# Patient Record
Sex: FEMALE | Race: OTHER | Employment: UNEMPLOYED | URBAN - METROPOLITAN AREA
[De-identification: names, ages, dates, MRNs, and addresses within clinical notes are randomized per-mention and may not be internally consistent; named-entity substitution may affect disease eponyms.]

---

## 2017-05-16 ENCOUNTER — HOSPITAL ENCOUNTER (OUTPATIENT)
Dept: LAB | Age: 67
Discharge: HOME | End: 2017-05-16

## 2017-05-16 ENCOUNTER — OFFICE VISIT (OUTPATIENT)
Dept: FAMILY MEDICINE CLINIC | Age: 67
End: 2017-05-16

## 2017-05-16 VITALS
TEMPERATURE: 98.1 F | SYSTOLIC BLOOD PRESSURE: 195 MMHG | WEIGHT: 130.8 LBS | BODY MASS INDEX: 27.46 KG/M2 | DIASTOLIC BLOOD PRESSURE: 85 MMHG | OXYGEN SATURATION: 96 % | HEIGHT: 58 IN | HEART RATE: 73 BPM

## 2017-05-16 DIAGNOSIS — Z13.0 SCREENING, ANEMIA, DEFICIENCY, IRON: ICD-10-CM

## 2017-05-16 DIAGNOSIS — I10 ESSENTIAL HYPERTENSION: ICD-10-CM

## 2017-05-16 LAB
ALBUMIN SERPL BCP-MCNC: 3.6 G/DL (ref 3.5–5)
ALBUMIN/GLOB SERPL: 0.9 {RATIO} (ref 1.1–2.2)
ALP SERPL-CCNC: 111 U/L (ref 45–117)
ALT SERPL-CCNC: 23 U/L (ref 12–78)
ANION GAP BLD CALC-SCNC: 10 MMOL/L (ref 5–15)
AST SERPL W P-5'-P-CCNC: 20 U/L (ref 15–37)
BILIRUB SERPL-MCNC: 0.3 MG/DL (ref 0.2–1)
BUN SERPL-MCNC: 13 MG/DL (ref 6–20)
BUN/CREAT SERPL: 17 (ref 12–20)
CALCIUM SERPL-MCNC: 9.8 MG/DL (ref 8.5–10.1)
CHLORIDE SERPL-SCNC: 97 MMOL/L (ref 97–108)
CO2 SERPL-SCNC: 31 MMOL/L (ref 21–32)
CREAT SERPL-MCNC: 0.75 MG/DL (ref 0.55–1.02)
EST. AVERAGE GLUCOSE BLD GHB EST-MCNC: 240 MG/DL
GLOBULIN SER CALC-MCNC: 4.2 G/DL (ref 2–4)
GLUCOSE POC: NORMAL MG/DL
GLUCOSE SERPL-MCNC: 194 MG/DL (ref 65–100)
HBA1C MFR BLD: 10 % (ref 4.2–6.3)
HGB BLD-MCNC: 14.6 G/DL
POTASSIUM SERPL-SCNC: 4.7 MMOL/L (ref 3.5–5.1)
PROT SERPL-MCNC: 7.8 G/DL (ref 6.4–8.2)
SODIUM SERPL-SCNC: 138 MMOL/L (ref 136–145)

## 2017-05-16 PROCEDURE — 83036 HEMOGLOBIN GLYCOSYLATED A1C: CPT | Performed by: NURSE PRACTITIONER

## 2017-05-16 PROCEDURE — 80053 COMPREHEN METABOLIC PANEL: CPT | Performed by: NURSE PRACTITIONER

## 2017-05-16 RX ORDER — LISINOPRIL 20 MG/1
20 TABLET ORAL DAILY
Qty: 30 TAB | Refills: 1 | Status: SHIPPED | OUTPATIENT
Start: 2017-05-16 | End: 2017-05-31 | Stop reason: DRUGHIGH

## 2017-05-16 NOTE — PROGRESS NOTES
Results for orders placed or performed in visit on 05/16/17   AMB POC GLUCOSE BLOOD, BY GLUCOSE MONITORING DEVICE   Result Value Ref Range    Glucose  NF mg/dL   AMB POC HEMOGLOBIN (HGB)   Result Value Ref Range    Hemoglobin (POC) 14.6

## 2017-05-16 NOTE — PROGRESS NOTES
Assessment/Plan:       ICD-10-CM ICD-9-CM    1. Diabetes, type 1.5, uncontrolled, managed as type 1 (HCC) E13.65 250.02 AMB POC GLUCOSE BLOOD, BY GLUCOSE MONITORING DEVICE      insulin NPH (NOVOLIN N, HUMULIN N) 100 unit/mL injection      HEMOGLOBIN A1C WITH EAG      METABOLIC PANEL, COMPREHENSIVE      insulin regular (NOVOLIN R, HUMULIN R) 100 unit/mL injection   2. Screening, anemia, deficiency, iron Z13.0 V78.0 AMB POC HEMOGLOBIN (HGB)   3. Essential hypertension C68 048.1 METABOLIC PANEL, COMPREHENSIVE      lisinopril (PRINIVIL, ZESTRIL) 20 mg tablet    Greater than 50% of this 25 minute visit was spent in face-to-face counseling/coordination of care regarding diabetes management. Follow-up Disposition:  Return ASA - next Tuesday, for diabetes, hypertension, primary care needs. [facility]  Subjective:   Berny Loomis is a 73 y.o.  female who speaks Upper sorbian. She has been here for 7 days, came from [place]. Here with her son. Losartan and humalog in a pen and lansoprazole. Use 25 in the morning and 10 units in the afternoon. On insulin for 2 years. Before that she was using another one called Greenphire. She has had diabetes for 20 years. The other one   Left: a year ago in August had amputation. On the left she had debridement in between great and second toe. Currently looks healthy. On the right she had amputation of the great toe. On the right this surgery was 2 years ago in October (1.5 years ago now). Chief Complaint   Patient presents with    Diabetes     f/u, pt c/o loosing her eye sight, med refill    Hypertension     f/u, med refill   Brought in medications? no Measuring glucoses? no     Work/employment: no  Lost the vision in the left eye 3 months ago. R one started 2 months ago. Is not aware that her eye problems are at all related to her chronic, uncontrolled HTN and DM. Has gastric reflux.   ROS:   no polyuria or polydipsia, no chest pain, dyspnea or TIA's. Social History: She reports that she has never smoked. She does not have any smokeless tobacco history on file. She reports that she does not drink alcohol or use illicit drugs. Family History: Her family history is not on file. No outpatient prescriptions prior to visit. No facility-administered medications prior to visit. Surgical History: No past surgical history on file. Objective:     Vitals:    05/16/17 1017 05/16/17 1030   BP: (!) 202/89 195/85   Pulse: 72 73   Temp: 98.1 °F (36.7 °C)    TempSrc: Oral    SpO2: 96%    Weight: 130 lb 12.8 oz (59.3 kg)    Height: 4' 9.6\" (1.463 m)     No LMP recorded. Patient is postmenopausal.  Results for orders placed or performed in visit on 05/16/17   AMB POC GLUCOSE BLOOD, BY GLUCOSE MONITORING DEVICE   Result Value Ref Range    Glucose  NF mg/dL   AMB POC HEMOGLOBIN (HGB)   Result Value Ref Range    Hemoglobin (POC) 14.6     She is fasting. Last ate 1 pm afternoon. Wt Readings from Last 2 Encounters:   05/16/17 130 lb 12.8 oz (59.3 kg)   Constitutional: She appears well-developed. Eyes: EOM are normal. Pupils are equal, round, and reactive to light. Neck: Neck supple. No thyromegaly present. Cardiovascular: Normal rate, regular rhythm, normal heart sounds and intact distal pulses. No murmur heard. Pulmonary/Chest: Effort normal and breath sounds normal.   Musculoskeletal: She exhibits no edema. No ulcers of the lower extremities. Assessment/Plan:   Radha Mckenna was seen today for diabetes and hypertension. Diagnoses and all orders for this visit:    Diabetes, type 1.5, uncontrolled, managed as type 1 ([address]  -     AMB POC GLUCOSE BLOOD, BY GLUCOSE MONITORING DEVICE  -     insulin NPH (NOVOLIN N, HUMULIN N) 100 unit/mL injection; 10 units sc q12h (English sig)  -     HEMOGLOBIN A1C WITH EAG; Future  -     METABOLIC PANEL, COMPREHENSIVE;  Future  -     insulin regular (NOVOLIN R, HUMULIN R) 100 unit/mL injection; Inject 15 units sc 30 minutes ac meals; Persian sig She still has some of her humalog left. Discussed she may continue to use this and when it is finished, she should switch to Regular Insulin due to affordability. Screening, anemia, deficiency, iron  -     AMB POC HEMOGLOBIN (HGB)    Essential hypertension  -     METABOLIC PANEL, COMPREHENSIVE; Future  -     lisinopril (PRINIVIL, ZESTRIL) 20 mg tablet; Take 1 Tab by mouth daily. For blood pressure instead of Losartan; Idania 1 tab diario en vez de Losartan    Diabetes Mellitus type 2, under Poor  control. Blood pressure under Poor control. Greater than 50% of this 25 minute visit was spent in face-to-face counseling/coordination of care regarding diabetes management. Follow-up Disposition:  Return ASAP - next Tuesday, for diabetes, hypertension, primary care needs. Needs education on using the meter; needs education on family member to inject insulin N. She has been in [place] for 1 week and is therefore not eligible for Access Now. We will provide information on VEI and explained that this would be self-pay. Lobo Bernal, MSN, RN, FNP-BC, BC-ADM  Kurtis Chou expressed understanding of this plan.

## 2017-05-23 ENCOUNTER — HOSPITAL ENCOUNTER (EMERGENCY)
Age: 67
Discharge: HOME | End: 2017-05-23
Attending: EMERGENCY MEDICINE
Payer: SELF-PAY

## 2017-05-23 ENCOUNTER — OFFICE VISIT (OUTPATIENT)
Dept: FAMILY MEDICINE CLINIC | Age: 67
End: 2017-05-23

## 2017-05-23 ENCOUNTER — APPOINTMENT (OUTPATIENT)
Dept: CT IMAGING | Age: 67
End: 2017-05-23
Attending: EMERGENCY MEDICINE
Payer: SELF-PAY

## 2017-05-23 VITALS
TEMPERATURE: 98.2 F | DIASTOLIC BLOOD PRESSURE: 73 MMHG | SYSTOLIC BLOOD PRESSURE: 208 MMHG | OXYGEN SATURATION: 100 % | HEART RATE: 89 BPM | RESPIRATION RATE: 16 BRPM

## 2017-05-23 VITALS
DIASTOLIC BLOOD PRESSURE: 88 MMHG | TEMPERATURE: 98.8 F | SYSTOLIC BLOOD PRESSURE: 216 MMHG | BODY MASS INDEX: 27.55 KG/M2 | OXYGEN SATURATION: 100 % | WEIGHT: 130 LBS

## 2017-05-23 DIAGNOSIS — H54.3 VISION LOSS, BILATERAL: ICD-10-CM

## 2017-05-23 DIAGNOSIS — I15.9 SECONDARY HYPERTENSION: Primary | ICD-10-CM

## 2017-05-23 DIAGNOSIS — E13.39: ICD-10-CM

## 2017-05-23 DIAGNOSIS — R73.09 ELEVATED GLUCOSE: ICD-10-CM

## 2017-05-23 LAB
ANION GAP BLD CALC-SCNC: 6 MMOL/L (ref 5–15)
BASOPHILS # BLD AUTO: 0.1 K/UL (ref 0–0.1)
BASOPHILS # BLD: 0 % (ref 0–1)
BUN SERPL-MCNC: 21 MG/DL (ref 6–20)
BUN/CREAT SERPL: 22 (ref 12–20)
CALCIUM SERPL-MCNC: 9 MG/DL (ref 8.5–10.1)
CHLORIDE SERPL-SCNC: 102 MMOL/L (ref 97–108)
CO2 SERPL-SCNC: 28 MMOL/L (ref 21–32)
CREAT SERPL-MCNC: 0.94 MG/DL (ref 0.55–1.02)
EOSINOPHIL # BLD: 0.2 K/UL (ref 0–0.4)
EOSINOPHIL NFR BLD: 1 % (ref 0–7)
ERYTHROCYTE [DISTWIDTH] IN BLOOD BY AUTOMATED COUNT: 14 % (ref 11.5–14.5)
GLUCOSE BLD STRIP.AUTO-MCNC: 195 MG/DL (ref 65–100)
GLUCOSE POC: NORMAL MG/DL
GLUCOSE SERPL-MCNC: 231 MG/DL (ref 65–100)
HCT VFR BLD AUTO: 41.1 % (ref 35–47)
HGB BLD-MCNC: 12.9 G/DL (ref 11.5–16)
LYMPHOCYTES # BLD AUTO: 27 % (ref 12–49)
LYMPHOCYTES # BLD: 3.3 K/UL (ref 0.8–3.5)
MCH RBC QN AUTO: 25.7 PG (ref 26–34)
MCHC RBC AUTO-ENTMCNC: 31.4 G/DL (ref 30–36.5)
MCV RBC AUTO: 82 FL (ref 80–99)
MONOCYTES # BLD: 0.8 K/UL (ref 0–1)
MONOCYTES NFR BLD AUTO: 7 % (ref 5–13)
NEUTS SEG # BLD: 8.1 K/UL (ref 1.8–8)
NEUTS SEG NFR BLD AUTO: 65 % (ref 32–75)
PLATELET # BLD AUTO: 238 K/UL (ref 150–400)
POTASSIUM SERPL-SCNC: 4.1 MMOL/L (ref 3.5–5.1)
RBC # BLD AUTO: 5.01 M/UL (ref 3.8–5.2)
SERVICE CMNT-IMP: ABNORMAL
SODIUM SERPL-SCNC: 136 MMOL/L (ref 136–145)
TROPONIN I SERPL-MCNC: <0.04 NG/ML
WBC # BLD AUTO: 12.5 K/UL (ref 3.6–11)

## 2017-05-23 PROCEDURE — 74011250637 HC RX REV CODE- 250/637: Performed by: EMERGENCY MEDICINE

## 2017-05-23 PROCEDURE — 93005 ELECTROCARDIOGRAM TRACING: CPT

## 2017-05-23 PROCEDURE — 70450 CT HEAD/BRAIN W/O DYE: CPT

## 2017-05-23 PROCEDURE — 99283 EMERGENCY DEPT VISIT LOW MDM: CPT

## 2017-05-23 PROCEDURE — 85025 COMPLETE CBC W/AUTO DIFF WBC: CPT | Performed by: EMERGENCY MEDICINE

## 2017-05-23 PROCEDURE — 80048 BASIC METABOLIC PNL TOTAL CA: CPT | Performed by: EMERGENCY MEDICINE

## 2017-05-23 PROCEDURE — 84484 ASSAY OF TROPONIN QUANT: CPT | Performed by: EMERGENCY MEDICINE

## 2017-05-23 PROCEDURE — 74011000250 HC RX REV CODE- 250: Performed by: EMERGENCY MEDICINE

## 2017-05-23 PROCEDURE — 36415 COLL VENOUS BLD VENIPUNCTURE: CPT | Performed by: EMERGENCY MEDICINE

## 2017-05-23 PROCEDURE — 82962 GLUCOSE BLOOD TEST: CPT

## 2017-05-23 RX ORDER — TETRACAINE HYDROCHLORIDE 5 MG/ML
1 SOLUTION OPHTHALMIC
Status: COMPLETED | OUTPATIENT
Start: 2017-05-23 | End: 2017-05-23

## 2017-05-23 RX ORDER — HYDROCHLOROTHIAZIDE 25 MG/1
25 TABLET ORAL
Status: COMPLETED | OUTPATIENT
Start: 2017-05-23 | End: 2017-05-23

## 2017-05-23 RX ORDER — HYDROCHLOROTHIAZIDE 25 MG/1
25 TABLET ORAL DAILY
Qty: 30 TAB | Refills: 0 | Status: SHIPPED | OUTPATIENT
Start: 2017-05-23 | End: 2017-05-31 | Stop reason: ALTCHOICE

## 2017-05-23 RX ADMIN — HYDROCHLOROTHIAZIDE 25 MG: 25 TABLET ORAL at 16:48

## 2017-05-23 RX ADMIN — TETRACAINE HYDROCHLORIDE 1 DROP: 5 SOLUTION OPHTHALMIC at 16:20

## 2017-05-23 NOTE — PROGRESS NOTES
Coordination of Care  1. Have you been to the ER, urgent care clinic since your last visit? Hospitalized since your last visit? No    2. Have you seen or consulted any other health care providers outside of the [address] [place] [address] since your last visit? Include any pap smears or colon screening.  No    Medications  Medication Reconciliation Performed: no  Patient does need refills     Learning Assessment Complete? yes    Results for orders placed or performed in visit on 05/23/17   AMB POC GLUCOSE BLOOD, BY GLUCOSE MONITORING DEVICE   Result Value Ref Range    Glucose POC non fasting 240 mg/dL

## 2017-05-23 NOTE — ED TRIAGE NOTES
Pt sent here for elevated BP, elevated glucose and blurry vision that has increased recently. Denies pain or n/v.   Delay in EKG and triage due to use of language line for triage.

## 2017-05-23 NOTE — Clinical Note
Naomy Sen, you will see this patient tomorrow. Her main concern is her vision loss and she is not eligible for Access Now. I recommend that she check fasting glucoses and aim for a range of . If she is above this, I'd recommend increasing her NPH insulin qhs by 2 units every 3 days. Call/text me if you have any questions.

## 2017-05-23 NOTE — DISCHARGE INSTRUCTIONS
We hope that we have addressed all of your medical concerns. The examination and treatment you received in the Emergency Department were for an emergent problem and were not intended as complete care. It is important that you follow up with your healthcare provider(s) for ongoing care. If your symptoms worsen or do not improve as expected, and you are unable to reach your usual health care provider(s), you should return to the Emergency Department. Today's healthcare is undergoing tremendous change, and patient satisfaction surveys are one of the many tools to assess the quality of medical care. You may receive a survey from the NPS regarding your experience in the Emergency Department. I hope that your experience has been completely positive, particularly the medical care that I provided. As such, please participate in the survey; anything less than excellent does not meet my expectations or intentions. [facility] and 8 [facility] participate in nationally recognized quality of care measures. If your blood pressure is greater than 120/80, as reported below, we urge that you seek medical care to address the potential of high blood pressure, commonly known as hypertension. Hypertension can be hereditary or can be caused by certain medical conditions, pain, stress, or \"white coat syndrome. \"       Please make an appointment with your health care provider(s) for follow up of your Emergency Department visit. VITALS:   Patient Vitals for the past 8 hrs:   Temp Pulse Resp BP SpO2   05/23/17 1816 - - - (!) 208/73 -   05/23/17 1650 - - - (!) 231/69 -   05/23/17 1516 98.2 °F (36.8 °C) 89 16 (!) 209/87 100 %          Thank you for allowing us to provide you with medical care today. We realize that you have many choices for your emergency care needs. Please choose us in the future for any continued health care needs.       Regards, Theadore Favre, MD    [facility], [facility].   Office: [phone]            Recent Results (from the past 24 hour(s))   AMB POC GLUCOSE BLOOD, BY GLUCOSE MONITORING DEVICE    Collection Time: 05/23/17  1:58 PM   Result Value Ref Range    Glucose POC non fasting 240 mg/dL   GLUCOSE, POC    Collection Time: 05/23/17  3:46 PM   Result Value Ref Range    Glucose (POC) 195 (H) 65 - 100 mg/dL    Performed by Cholo Euceda    CBC WITH AUTOMATED DIFF    Collection Time: 05/23/17  5:24 PM   Result Value Ref Range    WBC 12.5 (H) 3.6 - 11.0 K/uL    RBC 5.01 3.80 - 5.20 M/uL    HGB 12.9 11.5 - 16.0 g/dL    HCT 41.1 35.0 - 47.0 %    MCV 82.0 80.0 - 99.0 FL    MCH 25.7 (L) 26.0 - 34.0 PG    MCHC 31.4 30.0 - 36.5 g/dL    RDW 14.0 11.5 - 14.5 %    PLATELET 634 331 - 095 K/uL    NEUTROPHILS 65 32 - 75 %    LYMPHOCYTES 27 12 - 49 %    MONOCYTES 7 5 - 13 %    EOSINOPHILS 1 0 - 7 %    BASOPHILS 0 0 - 1 %    ABS. NEUTROPHILS 8.1 (H) 1.8 - 8.0 K/UL    ABS. LYMPHOCYTES 3.3 0.8 - 3.5 K/UL    ABS. MONOCYTES 0.8 0.0 - 1.0 K/UL    ABS. EOSINOPHILS 0.2 0.0 - 0.4 K/UL    ABS. BASOPHILS 0.1 0.0 - 0.1 K/UL   METABOLIC PANEL, BASIC    Collection Time: 05/23/17  5:24 PM   Result Value Ref Range    Sodium 136 136 - 145 mmol/L    Potassium 4.1 3.5 - 5.1 mmol/L    Chloride 102 97 - 108 mmol/L    CO2 28 21 - 32 mmol/L    Anion gap 6 5 - 15 mmol/L    Glucose 231 (H) 65 - 100 mg/dL    BUN 21 (H) 6 - 20 MG/DL    Creatinine 0.94 0.55 - 1.02 MG/DL    BUN/Creatinine ratio 22 (H) 12 - 20      GFR est AA >60 >60 ml/min/1.73m2    GFR est non-AA 60 (L) >60 ml/min/1.73m2    Calcium 9.0 8.5 - 10.1 MG/DL   TROPONIN I    Collection Time: 05/23/17  5:24 PM   Result Value Ref Range    Troponin-I, Qt. <0.04 <0.05 ng/mL       Ct Head Wo Cont    Result Date: 5/23/2017  EXAM:  CT HEAD WO CONT INDICATION:   vision loss. Hypertension. Blurred vision. Hyperglycemia. COMPARISON: None.  TECHNIQUE: Unenhanced CT of the head was performed using 5 mm images. Brain and bone windows were generated. CT dose reduction was achieved through use of a standardized protocol tailored for this examination and automatic exposure control for dose modulation. FINDINGS: The ventricles and sulci are normal in size, shape and configuration and midline. There is no significant white matter disease. There is no intracranial hemorrhage, extra-axial collection, mass, mass effect or midline shift. The basilar cisterns are open. No acute infarct is identified. The bone windows demonstrate no abnormalities. The visualized portions of the paranasal sinuses and mastoid air cells are clear.      IMPRESSION: Normal CT scan of the head without contrast

## 2017-05-23 NOTE — PROGRESS NOTES
Haitian only speaking patient sent to [facility] ED per provider after provider was given vitals and patient c/o of tingling on face, blurry vision, pt using cane and taking slow steps with son's assistance. Referral slip given to pt son to turn in to ED. Care card financial assitance information given and explained to patient's son. Patient son expressed worry over the bill, explained to him that patient needed care that the ED can provide as we are not an urgent care or emergency department. Explained to pt son that we have SW that can assist when bill arrives from ED but to contact [facility], # given, after the ED visit. Address given to [facility], This has been fully explained to the patient, who indicates understanding.

## 2017-05-23 NOTE — PROGRESS NOTES
Telephone call to Dr. Keara Kumar at [facility] ER ([phone]). She will add medication for HTN. I will have the patient return regarding her diabetes and further discussion of options for vision care. She is not eligible for Access Now for ophthalmology until she has been in [place], [place] for 6 months. They may call [employer] and explore self-pay options.

## 2017-05-23 NOTE — ED PROVIDER NOTES
HPI Comments: 73 y.o. female with past medical history significant for HTN, DM, and blindness L eye who presents to the ED with chief complaint of vision changes. Pt's son reports pt has hx of blindness in her left eye and has had progressively worsening blurred vision in her right eye for the past year that has especially increased over the past 3 months, says she has a \"funny feeling around her eyes. \" Pt states she has hx of an unknown surgical procedure on her bilateral eyes in [place]. Pt denies being seen by an ophthalmologist. Pt's son states pt has hx of HTN and DM, says she previously had medications from [facility] but was seen at the [address] [place] [address] last week for her blood pressure and was given new prescriptions for 20 mg lisinopril and Novolin. Pt states she is compliant with her BP medication, says she takes it daily at 0800 and has not missed any doses but her BP is \"always elevated at about 465-728'U systolically. \" Pt states she was seen at the [address] [place] [address] again today and was referred to the ED for further evaluation. Pt denies chest pain or headache. There are no other acute medical complaints voiced at this time. Chart review: Pt was seen at the [facility] on 5/16 and had blood work done that showed an elevated HbA1C. Pt was seen at the [facility] again today and had a BP of 216/88. Pt has hx of blindness in her left eye and decreased vision in her right eye. Pt has hx of toe amputation. Social Hx: Goes back and forth to [place] and [facility]. PCP: No primary care provider on file. Note written by Sharan Camarena, as dictated by Christina Michael MD 3:52 PM     The history is provided by the patient, a relative and medical records. The history is limited by a language barrier (British). A  was used (physician). No past medical history on file. No past surgical history on file. No family history on file.     Social History     Social History    Marital status:      Spouse name: N/A    Number of children: N/A    Years of education: N/A     Occupational History    Not on file. Social History Main Topics    Smoking status: Never Smoker    Smokeless tobacco: Not on file    Alcohol use No    Drug use: No    Sexual activity: Not on file     Other Topics Concern    Not on file     Social History Narrative         ALLERGIES: Review of patient's allergies indicates no known allergies. Review of Systems   Eyes: Positive for visual disturbance (blurred vision right eye (blind in L eye)). Cardiovascular: Negative for chest pain. Neurological: Negative for headaches. All other systems reviewed and are negative. Vitals:    05/23/17 1516   BP: (!) 209/87   Pulse: 89   Resp: 16   Temp: 98.2 °F (36.8 °C)   SpO2: 100%            Physical Exam   Constitutional: She is oriented to person, place, and time. She appears well-developed and well-nourished. No distress. HENT:   Head: Normocephalic and atraumatic. Eyes: Conjunctivae and EOM are normal. Pupils are equal, round, and reactive to light. irregularly shaped pupils b/l   Neck: Normal range of motion. Neck supple. Cardiovascular: Normal rate, regular rhythm, normal heart sounds and intact distal pulses. Exam reveals no friction rub. No murmur heard. Pulmonary/Chest: Effort normal and breath sounds normal. No respiratory distress. She has no wheezes. She has no rales. She exhibits no tenderness. Abdominal: Soft. Bowel sounds are normal. She exhibits no distension. There is no tenderness. There is no rebound and no guarding. Musculoskeletal: Normal range of motion. She exhibits no edema or tenderness. Neurological: She is alert and oriented to person, place, and time. No cranial nerve deficit. She exhibits normal muscle tone. Coordination normal.   Skin: Skin is warm and dry. She is not diaphoretic. No pallor. Psychiatric: She has a normal mood and affect.  Her behavior is normal. Nursing note and vitals reviewed. MDM  Number of Diagnoses or Management Options  Diabetes mellitus of other type with other ophthalmic complication, unspecified long term insulin use status:   Secondary hypertension:   Vision loss, bilateral:   Diagnosis management comments: htn- not nw presents last week and pt reports normally 180-190 son reports niece is giving bp meds so pt is getting them. Reviewed diet as welll for bp and DM suspect pt is not following diet. Will discuss with pcp at [address] adding bp med    DM- chronically elevated blood sugar elevated hb a1c at pcp last week will discuss with pcp to manage blood sugars  Vision loss- suspect retinopathy from DM and HTN has been going on for over a year and pt with ? Surgery prior in her country. Check pressure in eyes but this is not acute and will need to follow up with optho. Pt is nerly blind in both eyes       Amount and/or Complexity of Data Reviewed  Clinical lab tests: ordered and reviewed  Tests in the radiology section of CPT®: ordered and reviewed  Obtain history from someone other than the patient: yes (son)  Discuss the patient with other providers: yes (pcp)  Independent visualization of images, tracings, or specimens: yes (ekg)    Patient Progress  Patient progress: stable    ED Course       Procedures     EKG interpretation: (Preliminary)  Rhythm: normal sinus rhythm; and regular . Rate (approx.): 65; Axis: normal; P wave: normal; QRS interval: normal ; ST/T wave: non-specific changes;    CONSULT NOTE:  4:10 PM Gretel Flaherty MD spoke with Kwaku Hendrix NP, Consult for PCP. Discussed available diagnostic tests and clinical findings. She is in agreement with care plans as outlined. Kwaku Hendrix NP said pt's family is frustrated because pt is losing her vision and has to be in the [address] for 6 months in order to get established with access now and get eye care through them.  Recommends adding HCTZ for blood pressure and will call pt to adjust insulin because her blood sugar is not well controlled. PROGRESS NOTE:  4:52 PM   Checked pressure of pt's eyes. Left eye 23 mm Hg and right eye 22 mm Hg.     5:26 PM  Spoke with pt and son. Explained she has lost vision and needs to follow up with optho they will need to pay out of pocket will [facility] to [facility] as this is a longstanding issue. Son will try to get greencard for pt so she can have access to more resources. Reviewed diet and will add bp med,. They will follos up in 1 week.  Reviewed diet and importance of bp and glucose control extensively as pt needs those managed to help her vision

## 2017-05-24 LAB
ATRIAL RATE: 66 BPM
CALCULATED P AXIS, ECG09: 22 DEGREES
CALCULATED R AXIS, ECG10: 16 DEGREES
CALCULATED T AXIS, ECG11: 52 DEGREES
DIAGNOSIS, 93000: NORMAL
P-R INTERVAL, ECG05: 130 MS
Q-T INTERVAL, ECG07: 396 MS
QRS DURATION, ECG06: 82 MS
QTC CALCULATION (BEZET), ECG08: 415 MS
VENTRICULAR RATE, ECG03: 66 BPM

## 2017-05-25 PROBLEM — I10 ESSENTIAL HYPERTENSION: Status: ACTIVE | Noted: 2017-05-25

## 2017-05-30 ENCOUNTER — TELEPHONE (OUTPATIENT)
Dept: FAMILY MEDICINE CLINIC | Age: 67
End: 2017-05-30

## 2017-05-30 NOTE — TELEPHONE ENCOUNTER
I returned the patient's son, Mr. Queta Scruggs, phone call who asked me to contact his brother, Mr. Gabe Russell, because he was at work and could not speak but he needed to schedule a follow up appointment for his mother after the ER encounter on May 23, 2017. Both children are authorized parties to disclose health information per PHI signed on 05/16/17. I called Mr. Ashish Moy, and after speaking with the Nurse Navigator Des Vásquez, I  scheduled the follow up appointment  with Conrado Villaseñor NP for tomorrow at the [facility] location. Mr. Gabe Russell verbalized understanding and was very grateful that we could accommodate his mother in tomorrow's clinic. Per Mr. Gabe Russell request, I sent via text the appointment information. Phone call routed to Joseline Anna, [address] [place] [address], [place]OBERTO.     Elena Watson

## 2017-05-31 ENCOUNTER — OFFICE VISIT (OUTPATIENT)
Dept: FAMILY MEDICINE CLINIC | Age: 67
End: 2017-05-31

## 2017-05-31 VITALS
DIASTOLIC BLOOD PRESSURE: 78 MMHG | SYSTOLIC BLOOD PRESSURE: 180 MMHG | BODY MASS INDEX: 28.12 KG/M2 | TEMPERATURE: 98.6 F | WEIGHT: 132.7 LBS | HEART RATE: 75 BPM

## 2017-05-31 DIAGNOSIS — I10 ESSENTIAL HYPERTENSION: ICD-10-CM

## 2017-05-31 DIAGNOSIS — Z13.1 SCREENING FOR DIABETES MELLITUS (DM): ICD-10-CM

## 2017-05-31 LAB — GLUCOSE POC: NORMAL MG/DL

## 2017-05-31 RX ORDER — LISINOPRIL AND HYDROCHLOROTHIAZIDE 20; 25 MG/1; MG/1
1 TABLET ORAL DAILY
Qty: 90 TAB | Refills: 1 | Status: SHIPPED | OUTPATIENT
Start: 2017-05-31

## 2017-05-31 RX ORDER — GLIPIZIDE 5 MG/1
5 TABLET ORAL 2 TIMES DAILY
Qty: 60 TAB | Refills: 2 | Status: SHIPPED | OUTPATIENT
Start: 2017-05-31

## 2017-05-31 NOTE — PROGRESS NOTES
Subjective:     Chief Complaint   Patient presents with   [facility] Follow Up    Eye Problem     [facility]        She  is a 73 y.o. female who presents for evaluation of continued bilateral eye pain and vision loss in L eye and poor vision in R eye. Has been continuing insulin NPH and reg BID as directed. Went to the ED on 5/23 and was started on HCTZ for additional BP control. Blood sugars have been running in the upper 180s. BP continues in the 170s. No hypoglycemic episodes. Current Rx well tolerated. Pt also c/o of gastritis x 3-4 months. Poor relief from OTC nexium. ROS  Gen - no fever/chills  Resp - no dyspnea or cough  CV - no chest pain or ESPINO  Rest per HPI    No past medical history on file. No past surgical history on file. Current Outpatient Prescriptions on File Prior to Visit   Medication Sig Dispense Refill    hydroCHLOROthiazide (HYDRODIURIL) 25 mg tablet Take 1 Tab by mouth daily for 30 days. 30 Tab 0    insulin NPH (NOVOLIN N, HUMULIN N) 100 unit/mL injection 10 units sc q12h (Bahamian sig) 1 Vial 2    lisinopril (PRINIVIL, ZESTRIL) 20 mg tablet Take 1 Tab by mouth daily. For blood pressure instead of Losartan; Idania 1 tab diario en vez de Losartan 30 Tab 1    insulin regular (NOVOLIN R, HUMULIN R) 100 unit/mL injection Inject 15 units sc 30 minutes ac meals; Bahamian sig 1 Vial 1     No current facility-administered medications on file prior to visit.          Objective:     Vitals:    05/31/17 1456 05/31/17 1504   BP: 172/77 180/78   Pulse: 74 75   Temp: 98.6 °F (37 °C)    TempSrc: Oral    Weight: 132 lb 11.2 oz (60.2 kg)        Physical Examination:  General appearance - alert, well appearing, and in no distress  Eyes -sclera anicteric  Neck - supple, no significant adenopathy, no thyromegaly  Chest - clear to auscultation, no wheezes, rales or rhonchi, symmetric air entry  Heart - normal rate, regular rhythm, normal S1, S2, no murmurs, rubs, clicks or gallops  Neurological - alert, oriented, no focal findings or movement disorder noted  Abdomen-BS present/WNL x 4 quads, non-tender/distended, soft,no organomegaly    Recent Results (from the past 12 hour(s))   AMB POC GLUCOSE BLOOD, BY GLUCOSE MONITORING DEVICE    Collection Time: 05/31/17  3:01 PM   Result Value Ref Range    Glucose  Non-fasting mg/dL       Assessment/ Plan:   Stephany Diana was seen today for hospital follow up and eye problem. Diagnoses and all orders for this visit:    Diabetes, type 1.5, uncontrolled, managed as type 1 ([address]  -     lisinopril-hydroCHLOROthiazide (PRINZIDE, ZESTORETIC) 20-25 mg per tablet; Take 1 Tab by mouth daily.  -     glipiZIDE (GLUCOTROL) 5 mg tablet; Take 1 Tab by mouth two (2) times a day. Screening for diabetes mellitus (DM)  -     AMB POC GLUCOSE BLOOD, BY GLUCOSE MONITORING DEVICE    Essential hypertension  -     lisinopril-hydroCHLOROthiazide (PRINZIDE, ZESTORETIC) 20-25 mg per tablet; Take 1 Tab by mouth daily. ? Compliance w/ BP meds given minor change since LOV. Will have Pt start trial of PO glipizide given improving sugars. Continue current insulin dose. Refer to [facility] for follow-up/Crossover eye exam appt referral. Discussed timing and how Pt may benefit from a community eye exam   For Rx/meds in the meantime. Combine lisinopril/HCTZ into one pill. Re-assess BP in 3-4 weeks. Unable to check labs today, will check H Pylori at f/u. I have discussed the diagnosis with the patient and the intended plan as seen in the above orders. The patient has received an after-visit summary and questions were answered concerning future plans. I have discussed medication side effects and warnings with the patient as well. The patient verbalizes understanding and agreement with the plan. Follow-up Disposition:  Return in about 4 weeks (around 6/28/2017).

## 2017-05-31 NOTE — PATIENT INSTRUCTIONS
Glipizida (Por la boca)   Se [place] para tratar la diabetes tipo 2. Angelita(s) : Glucotrol, Glucotrol XL   Existen muchas otras marcas de [place]. Sara medicamento no debe ser usado cuando:   Sara medicamento no es adecuado para todas las personas. No lo use si ha tenido leon reacción alérgica a la glipizida. Forma de usar sara medicamento:   Bernadine Olga de liberación prolongada  · [place] du medicamentos jermaine se le haya indicado. Es probable que sea necesario cambiar quinteros dosis varias veces hasta encontrar la que funciona mejor para usted. · [place] la tableta regular  aproximadamente 30 minutos antes de comer. [place] la tableta de liberación prolongada  con el desayuno, a menos que quinteros médico le indique leon hora diferente. · [place] la tableta de liberación prolongada entera. No triture, rompa o mastique. · Sia y siga las instrucciones para el paciente que vienen con el medicamento. Hable con quinteros médico o farmacéutico si tiene alguna pregunta. · Si olvida leon dosis: Si olvida leon dosis de quinteros medicamento, tómelo lo más pronto posible. Si es jen la hora para quinteros próxima dosis, espere hasta entonces para maría quinteros dosis regular. No use medicamento adicional para reponer la dosis olvidada. · Guarde el medicamento en un recipiente cerrado a temperatura ambiente y alejado del calor, la humedad y la leah directa. Medicamentos y [employer] que debe evitar:   Consulte con quinteros médico o farmacéutico antes de usar cualquier medicamento, incluyendo los que compra sin receta médica, las vitaminas y los productos herbales. · Algunos medicamentos podrían afectar la función de glipizida.  Informe a quinteros médico si está usando cualquiera de los siguientes:  ¨ Aspirina, cloramfenicol, fluconazol, isoniazida, miconazol, niacina, fenitoína, probenecida  ¨ Las píldoras para evitar embarazo  ¨ Medicamento con betabloqueadores  ¨ Medicamentos para la presión arterial  ¨ Diurético  ¨ Inhibidor de la MAO (monoamino oxidasa)  ¨ Medicamento para tratar Vuong Cookey infección (incluyendo ciprofloxacina, levofloxacina, moxifloxacina)  ¨ Medicamentos antiinflamatorios no esteroides (GARTH) para el dolor o la artritis (incluyendo celecoxib, diclofenac, ibuprofeno, naproxeno)  ¨ Medicamentos con fenotiazina  ¨ Medicamentos esteroideos  ¨ Medicamento con sulfa  ¨ El medicamento tiroideo  · Si usted zo colesevelam también, tómelo al menos 4 horas después de maría glipizida. Precauciones juju el uso de sara medicamento:   · Infórmele al médico si usted está embarazada o dando de lactar, o si tiene enfermedad renal, enfermedad hepática, problemas con el corazón o vasos sanguíneos, problemas estomacales o intestinales, problemas con la glándula suprarrenal o pituitaria o deficiencia de [id number] deshidrogenasa (G6PD). Informe a quinteros médico si usted consume alcohol. · Sara medicamento puede provocarle los siguientes problemas:  ¨ Mayor riesgo de problemas en el corazón o vasos sanguíneos  ¨ Nivel bajo de azúcar en la nery  · Parte de las tabletas de liberación prolongada  puede salir en du evacuaciones. [place] es normal.  · El médico solicitará exámenes de laboratorio juju las citas de rutina para revisar los efectos de [place]. Asista a todas du citas. · Guarde todos los medicamentos fuera del alcance de los niños. Nunca comparta du medicamentos con [employer].   Efectos secundarios que pueden presentarse juju el uso de sara medicamento:   Consulte inmediatamente con el médico si nota cualquiera de estos efectos secundarios:  · Reacción alérgica: Comezón o ronchas, hinchazón del mala o las cate, hinchazón u hormigueo en la boca o garganta, opresión en el pecho, dificultad para respirar  · Visión borrosa, cambios en la vista  · Latido cardíaco acelerado o alyssa, desvanecimiento, mareos  · Dolor de susie o confusión  · Transpiración, temblores, estremecimientos, aumento de apetito  Consulte con el médico si nota los siguientes [place] secundarios menos graves:   · Náuseas, diarrea, malestar estomacal leves  Consulte con el médico si nota otros efectos secundarios que christina son causados por margareth medicamento. Llame a quinteros médico para consultarle Kirill. Usted puede notificar du efectos secundarios al [facility] al [phone]. © 2017 [address]  Information is for End User's use only and may not be sold, redistributed or otherwise used for commercial purposes. Esta información es sólo para uso en educación. Quinteros intención no es darle un consejo médico sobre enfermedades o tratamientos. Colsulte con quinteros Nia Holy farmacéutico antes de seguir cualquier régimen médico para saber si es seguro y efectivo para usted.

## 2017-05-31 NOTE — PROGRESS NOTES
Coordination of Care  1. Have you been to the ER, urgent care clinic since your last visit? Hospitalized since your last visit? 5/23/17, [facility]    2. Have you seen or consulted any other health care providers outside of the [address] [place] [address] since your last visit? Include any pap smears or colon screening.  No    Medications  Medication Reconciliation Performed: no  Patient does need refills     Learning Assessment Complete? yes  Results for orders placed or performed in visit on 05/31/17   AMB POC GLUCOSE BLOOD, BY GLUCOSE MONITORING DEVICE   Result Value Ref Range    Glucose  Non-fasting mg/dL

## 2017-06-12 ENCOUNTER — OFFICE VISIT (OUTPATIENT)
Dept: FAMILY MEDICINE CLINIC | Age: 67
End: 2017-06-12

## 2017-06-12 VITALS
HEART RATE: 84 BPM | SYSTOLIC BLOOD PRESSURE: 169 MMHG | DIASTOLIC BLOOD PRESSURE: 71 MMHG | WEIGHT: 132.2 LBS | BODY MASS INDEX: 28.02 KG/M2 | TEMPERATURE: 99.3 F

## 2017-06-12 DIAGNOSIS — I10 ESSENTIAL HYPERTENSION: ICD-10-CM

## 2017-06-12 LAB — GLUCOSE POC: NORMAL MG/DL

## 2017-06-12 RX ORDER — SYRINGE-NEEDLE,INSULIN,0.5 ML 30 GX5/16"
SYRINGE, EMPTY DISPOSABLE MISCELLANEOUS
Qty: 100 SYRINGE | Refills: 11 | Status: SHIPPED | OUTPATIENT
Start: 2017-06-12

## 2017-06-12 NOTE — PROGRESS NOTES
Coordination of Care  1. Have you been to the ER, urgent care clinic since your last visit? Hospitalized since your last visit? No    2. Have you seen or consulted any other health care providers outside of the [address] [place] [address] since your last visit? Include any pap smears or colon screening.  No    Medications  Medication Reconciliation Performed: yes  Patient does need refills     Learning Assessment Complete? yes  Results for orders placed or performed in visit on 06/12/17   AMB POC GLUCOSE BLOOD, BY GLUCOSE MONITORING DEVICE   Result Value Ref Range    Glucose  non fasting mg/dL

## 2017-06-12 NOTE — MR AVS SNAPSHOT
Visit Information Beau Sheffield Personal Médico Departamento Teléfono del Dep. Número de visita 6/12/2017  1:15 PM Stanford Snellen, [place] [phone] Follow-up Instructions Return for Tylenol 325mg 1-2 by mouth every 4 hours for sore throat. Upcoming Health Maintenance Date Due Hepatitis C Screening 1950 LIPID PANEL Q1 1950 FOOT EXAM Q1 11/30/1961 MICROALBUMIN Q1 11/30/1961 EYE EXAM RETINAL OR DILATED Q1 11/30/1961 DTaP/Tdap/Td series (1 - Tdap) 11/30/1972 BREAST CANCER SCRN MAMMOGRAM 11/30/2001 FOBT Q 1 YEAR AGE 50-75 11/30/2001 ZOSTER VACCINE AGE 60> 11/30/2011 GLAUCOMA SCREENING Q2Y 11/30/2016 OSTEOPOROSIS SCREENING (DEXA) 11/30/2016 Pneumococcal 65+ Low/Medium Risk (1 of 2 - PCV13) 11/30/2016 MEDICARE YEARLY EXAM 11/30/2016 INFLUENZA AGE 9 TO ADULT 8/1/2017 HEMOGLOBIN A1C Q6M 11/16/2017 [place]  Review Complete El: 6/12/2017 Por: Stanford Snellen, NP  
 Giftyclarence Morrow del:  6/12/2017 No Known Allergies Vacunas actuales [place] No hay ninguna vacuna archivada. No revisadas esta visita You Were Diagnosed With   
  
 Blondie Bel Diabetes, type 1.5, uncontrolled, managed as type 1 ([facility]    -  Primary ICD-10-CM: E13.65 ICD-9-CM: 250.02 Partes vitales PS Pulso Temperatura Peso (percentil de crecimiento) BMI ([facility] Estado obstétrico  
 169/71 (BP 1 Location: Left arm, BP Patient Position: Sitting) 84 99.3 °F (37.4 °C) (Oral) 132 lb 3.2 oz (60 kg) 28.02 kg/m2 Postmenopausal  
 Estatus de tabaquísmo Never Smoker Historial de signos vitales BMI and BSA Data Body Mass Index Body Surface Area 28.02 kg/m 2 1.56 m 2 [facility] Name Phone [facility] [address] [place] [address] [phone] [place] lista de medicamentos actualizada Lista actualizada el: 6/12/17  1:57 PM.  Nella Angle use quinteros lista de medicamentos más reciente. glipiZIDE 5 mg tablet También conocido jermaine:  Cindy Bob Take 1 Tab by mouth two (2) times a day. insulin  unit/mL injection También conocido jermaine:  NOVOLIN N, HUMULIN N  
10 units sc q12h (Romanian sig)  
  
 insulin regular 100 unit/mL injection También conocido jermaine:  Jeannene Blizzard, HUMULIN R Inject 15 units sc 30 minutes ac meals; Romanian sig Insulin Syringe-Needle U-100 1/2 mL 30 gauge x 5/16 Syrg También conocido jermaine:  INSULIN SYRINGE Inject NPH insulin every 12 hours and Insulin R 30 minutes before meals. lisinopril-hydroCHLOROthiazide 20-25 mg per tablet También conocido jermaine:  Silva Fothergill Take 1 Tab by mouth daily. Recetas Enviado a la [place] Refills Insulin Syringe-Needle U-100 (INSULIN SYRINGE) 1/2 mL 30 gauge x 5/16 syrg 11 Sig: Inject NPH insulin every 12 hours and Insulin R 30 minutes before meals. Class: Normal  
 Pharmacy: [address] [facility], [address] [facility] #: [phone] Hicimos lo siguiente AMB POC GLUCOSE BLOOD, BY GLUCOSE MONITORING DEVICE [62966 CPT(R)] Instrucciones de seguimiento Return for Tylenol 325mg 1-2 by mouth every 4 hours for sore throat. Instrucciones para el Paciente 1. For throat: Tylenol 325mg, take 1 to 2 pills by mouth every 4 hours as needed for pain. 2. [facility] Exam 
3. Prescription for syringes for insulin Introducing [facility]! [facility] introduce portal paciente Lawanda . Ahora se puede acceder a partes de quinteros expediente médico, enviar por correo electrónico la oficina de quinteros médico y solicitar renovaciones de medicamentos en línea. En quinteros navegador de Internet , Andrea Pacheco a [email or web address] Winsome vitaly en el usuario por Shobha Venegas?  Kyle haider aquí en la Gayle Manzano. Verá la página de registro [place]. Ingrese quinteros código de [employer] leanne y jermaine aparece a continuación. Usted no tendrá que [employer] código después de laquita completado el proceso de registro . Si usted no se inscribe antes de la fecha de caducidad , debe solicitar un nuevo código. · MyChart Romainjay Pries : [id number] Expires: 8/14/2017 11:32 AM 
 
Ingresa los últimos cuatro dígitos de quinteros Número de Seguro Social ( xxxx ) y fecha de nacimiento ( dd / mm / aaaa ) jermaine se indica y winsome clic en Enviar. Usted será llevado a la siguiente página de registro . Crear un ID MyChart . Esta será quinteros ID de inicio de sesión de MyChart y no puede ser [place] , por lo que pensar en leon que es Ilona Overlie y fácil de recordar . Crear leon contraseña MyChart . Usted puede cambiar quinteros contraseña en cualquier momento . Ingrese quinteros Password Reset de preguntas y [place] . [place] se puede utilizar en un momento posterior si usted olvida quinteros contraseña. Introduzca quinteros dirección de correo electrónico . Veronica Perdomo recibirá leon notificación por correo electrónico cuando la nueva información está disponible en MyChart . Roderick cortesic en Registrarse. Quinton Mcleod zelda y descargar porciones de quinteros expediente médico. 
Winsome clic en el enlace de descarga del menú Resumen para descargar leon copia portátil de quinteros información médica . Si tiene [employer] , por favor visite la sección de preguntas frecuentes del sitio web MyChart . Recuerde, MyChart NO es que se utilizará para las necesidades urgentes. Para emergencias médicas , llame al 911 . Ahora disponible en quinteros iPhone y Android ! Por favor proporcione margareth resumen de la documentación de cuidado a quinteros próximo proveedor. Your primary care clinician is listed as NONE. If you have any questions after today's visit, please call [phone].

## 2017-06-12 NOTE — PROGRESS NOTES
Assessment/Plan:       ICD-10-CM ICD-9-CM    1. Diabetes, type 1.5, uncontrolled, managed as type 1 (HCC) E13.65 250.02 AMB POC GLUCOSE BLOOD, BY GLUCOSE MONITORING DEVICE      Insulin Syringe-Needle U-100 (INSULIN SYRINGE) 1/2 mL 30 gauge x 5/16 syrg   2. Essential hypertension I10 401.9        [facility]  Subjective:     Chief Complaint   Patient presents with    Eye Problem      cant see, fluid coming out, and sore throat    Diabetes    Marylee Broccoli is a 73 y.o. OTHER female. HPI: Needs appt with crossover eye. She was using a spray for her throat. She  has no past medical history on file. She has Diabetes, type 1.5, uncontrolled, managed as type 1 ([address] and Essential hypertension on her problem list.  Can't see is a separate complaint from sore throat and cough with mucus. Review of Systems: Negative for: fever, chest pain, shortness of breath, leg swelling, exertional dyspnea, palpitations. Current Medications:   Current Outpatient Prescriptions on File Prior to Visit   Medication Sig    lisinopril-hydroCHLOROthiazide (PRINZIDE, ZESTORETIC) 20-25 mg per tablet Take 1 Tab by mouth daily.  glipiZIDE (GLUCOTROL) 5 mg tablet Take 1 Tab by mouth two (2) times a day.  insulin NPH (NOVOLIN N, HUMULIN N) 100 unit/mL injection 10 units sc q12h (Surinamese sig)    insulin regular (NOVOLIN R, HUMULIN R) 100 unit/mL injection Inject 15 units sc 30 minutes ac meals; Surinamese sig     No current facility-administered medications on file prior to visit. Past Surgical History: She  has no past surgical history on file. Social and Family History: She  reports that she has never smoked. She does not have any smokeless tobacco history on file. She reports that she does not drink alcohol or use illicit drugs. ; family history is not on file.   Objective:     Vitals:    06/12/17 1320   BP: 169/71   Pulse: 84   Temp: 99.3 °F (37.4 °C)   TempSrc: Oral   Weight: 132 lb 3.2 oz (60 kg)    No LMP recorded. Patient is postmenopausal.   Wt Readings from Last 2 Encounters:   06/12/17 132 lb 3.2 oz (60 kg)   05/31/17 132 lb 11.2 oz (60.2 kg)     Results for orders placed or performed in visit on 06/12/17   AMB POC GLUCOSE BLOOD, BY GLUCOSE MONITORING DEVICE   Result Value Ref Range    Glucose  non fasting mg/dL      Physical Examination:  General appearance - well developed, no acute distress. Chest - clear to auscultation. Heart - regular rate and rhythm without murmurs, rubs, or gallops. Abdomen - bowel sounds present x 4, NT, ND. Extremities - pulses intact. No peripheral edema. Assessment/Plan:   Eduin Gipson was seen today for eye problem and diabetes. Diagnoses and all orders for this visit:    Diabetes, type 1.5, uncontrolled, managed as type 1 ([address]  -     AMB POC GLUCOSE BLOOD, BY GLUCOSE MONITORING DEVICE  -     Insulin Syringe-Needle U-100 (INSULIN SYRINGE) 1/2 mL 30 gauge x 5/16 syrg; Inject NPH insulin every 12 hours and Insulin R 30 minutes before meals. Continue current dosing. Essential hypertension  Continue current for HTN. Follow-up Disposition:  Return in about 2 months (around 8/12/2017), or return 2 months for diabetes; sooner as needed; end of June to [facility] for eye exam, for Tylenol 325mg 1-2 by mouth every 4 hours for sore throat. Maria T Singleton, HOLLEY, FNP-BC, BC-ADM  Henri Limon expressed understanding of this plan.

## 2017-06-12 NOTE — PATIENT INSTRUCTIONS
1. For throat: Tylenol 325mg, take 1 to 2 pills by mouth every 4 hours as needed for pain. 2. [facility] Eye Exam  3.  Prescription for syringes for insulin

## 2017-07-03 ENCOUNTER — DOCUMENTATION ONLY (OUTPATIENT)
Dept: FAMILY MEDICINE CLINIC | Age: 67
End: 2017-07-03

## 2017-08-23 NOTE — PROGRESS NOTES
Unable to refer this patient to Retina specialist via [facility] as pt is not eligible. She has not lived in the area for 6 months. [employer] aware that referral cannot be processed yet.